# Patient Record
Sex: FEMALE | Race: WHITE | HISPANIC OR LATINO | URBAN - METROPOLITAN AREA
[De-identification: names, ages, dates, MRNs, and addresses within clinical notes are randomized per-mention and may not be internally consistent; named-entity substitution may affect disease eponyms.]

---

## 2017-06-01 ENCOUNTER — EMERGENCY (EMERGENCY)
Facility: HOSPITAL | Age: 42
LOS: 0 days | Discharge: HOME | End: 2017-06-01

## 2018-02-07 ENCOUNTER — EMERGENCY (EMERGENCY)
Facility: HOSPITAL | Age: 43
LOS: 0 days | Discharge: HOME | End: 2018-02-07
Attending: EMERGENCY MEDICINE | Admitting: FAMILY MEDICINE

## 2018-02-07 VITALS
SYSTOLIC BLOOD PRESSURE: 123 MMHG | TEMPERATURE: 98 F | HEART RATE: 96 BPM | RESPIRATION RATE: 20 BRPM | DIASTOLIC BLOOD PRESSURE: 64 MMHG | OXYGEN SATURATION: 96 %

## 2018-02-07 VITALS
TEMPERATURE: 98 F | HEART RATE: 75 BPM | OXYGEN SATURATION: 100 % | DIASTOLIC BLOOD PRESSURE: 63 MMHG | RESPIRATION RATE: 20 BRPM | SYSTOLIC BLOOD PRESSURE: 131 MMHG

## 2018-02-07 DIAGNOSIS — Z98.51 TUBAL LIGATION STATUS: ICD-10-CM

## 2018-02-07 DIAGNOSIS — K08.51: ICD-10-CM

## 2018-02-07 DIAGNOSIS — S02.5XXA FRACTURE OF TOOTH (TRAUMATIC), INITIAL ENCOUNTER FOR CLOSED FRACTURE: ICD-10-CM

## 2018-02-07 DIAGNOSIS — K04.7 PERIAPICAL ABSCESS WITHOUT SINUS: ICD-10-CM

## 2018-02-07 DIAGNOSIS — J45.909 UNSPECIFIED ASTHMA, UNCOMPLICATED: ICD-10-CM

## 2018-02-07 DIAGNOSIS — Z79.899 OTHER LONG TERM (CURRENT) DRUG THERAPY: ICD-10-CM

## 2018-02-07 DIAGNOSIS — Z98.51 TUBAL LIGATION STATUS: Chronic | ICD-10-CM

## 2018-02-07 DIAGNOSIS — K03.81 CRACKED TOOTH: ICD-10-CM

## 2018-02-07 RX ORDER — MONTELUKAST 4 MG/1
0 TABLET, CHEWABLE ORAL
Qty: 0 | Refills: 0 | COMMUNITY

## 2018-02-07 RX ORDER — BUPROPION HYDROCHLORIDE 150 MG/1
0 TABLET, EXTENDED RELEASE ORAL
Qty: 0 | Refills: 0 | COMMUNITY

## 2018-02-07 RX ORDER — SERTRALINE 25 MG/1
0 TABLET, FILM COATED ORAL
Qty: 0 | Refills: 0 | COMMUNITY

## 2018-02-07 RX ORDER — SPIRONOLACTONE 25 MG/1
0 TABLET, FILM COATED ORAL
Qty: 0 | Refills: 0 | COMMUNITY

## 2018-02-07 RX ORDER — LORATADINE 10 MG/1
0 TABLET ORAL
Qty: 0 | Refills: 0 | COMMUNITY

## 2018-02-07 NOTE — CONSULT NOTE ADULT - SUBJECTIVE AND OBJECTIVE BOX
lower left fracture tooth #19, existing amalgam , no abscess no fistula present.   patient afebrile , no swelling.

## 2018-02-07 NOTE — ED PROVIDER NOTE - OBJECTIVE STATEMENT
41 yo female with PMH of anxiety, asthma presents to the ED c/o cracked tooth with abscess x 3 days.  Pt. states her tooth cracked about three months ago, which caused an abscess to develop, but would come and go so ignored it.  Pt. states the pain got significantly worse over the past three days and decided to go to a Dentist yesterday, who told her she has an abscess in cracked tooth #19.  Patient states the Dentist prescribed her with Clindamycin and 800 mg of Motrin, which she took her last dose at around 5 pm.  Pt. is still in significant pain and tried using Orajel with minimal relief.  Pt. denies any SOB, chest pain, fever, chills ,recent illness, abdominal pain, headaches, or cough.  LMP 1/10/18.

## 2018-02-07 NOTE — ED PROVIDER NOTE - NS ED ROS FT

## 2018-02-07 NOTE — ED PROVIDER NOTE - PROGRESS NOTE DETAILS
Discussed with dental resident, will consult. Pt. is alert and answering questions appropriately.  Pt. is in no acute distress.  Pt. received nerve block and is feeling some relief.  Pt. advised to go to dental clinic tomorrow morning. I was directly involved in the management of this patient. Case was discussed with PA Fellow Chanelle ATTENDING NOTE:   43 y/o F with HX of asthma presents with L lower dental pain x 3 days. Pt states she cracked her tooth approximately 3 months ago and now has been having pain and swelling to the area. She went to the dentist yesterday, was started on Clindamycin, and was referred to an oral surgeon who she wasn’t able to see due to insurance issues. No fever/chills.   Exam- Pt is WA, sitting up on bed eating food, normal speech, multiple carries noted to lower mouth.  Plan- Will d/c pt to return here tomorrow for dental clinic.

## 2018-02-07 NOTE — ED PROVIDER NOTE - PHYSICAL EXAMINATION
PHYSICAL EXAM:  GENERAL: NAD, well-developed  HEENT: #19 fractured tooth pain radiates through #17-18.  No erythema.  NECK: Supple, non-tender. .No erythema  CHEST/LUNG: Clear to auscultation bilaterally; No wheeze, rhonchi, or rales  HEART: Regular rate and rhythm; No murmurs, rubs, or gallops  ABDOMEN: Soft, Nontender, Nondistended; Bowel sounds present

## 2018-02-07 NOTE — CONSULT NOTE ADULT - PROBLEM SELECTOR RECOMMENDATION 9
patient must follow already prescribed antibiotics by her dentist, must take naproxen 600mg q12hrs as recommended and must seek dental care .

## 2018-02-08 ENCOUNTER — EMERGENCY (EMERGENCY)
Facility: HOSPITAL | Age: 43
LOS: 0 days | Discharge: HOME | End: 2018-02-08

## 2018-02-08 VITALS
HEART RATE: 79 BPM | SYSTOLIC BLOOD PRESSURE: 110 MMHG | TEMPERATURE: 98 F | DIASTOLIC BLOOD PRESSURE: 54 MMHG | RESPIRATION RATE: 16 BRPM | OXYGEN SATURATION: 97 %

## 2018-02-08 DIAGNOSIS — Z98.51 TUBAL LIGATION STATUS: Chronic | ICD-10-CM

## 2018-02-08 DIAGNOSIS — Z88.0 ALLERGY STATUS TO PENICILLIN: ICD-10-CM

## 2018-02-08 DIAGNOSIS — K08.89 OTHER SPECIFIED DISORDERS OF TEETH AND SUPPORTING STRUCTURES: ICD-10-CM

## 2018-02-08 DIAGNOSIS — F17.200 NICOTINE DEPENDENCE, UNSPECIFIED, UNCOMPLICATED: ICD-10-CM

## 2018-02-08 DIAGNOSIS — Z79.899 OTHER LONG TERM (CURRENT) DRUG THERAPY: ICD-10-CM

## 2018-02-08 NOTE — ED PROVIDER NOTE - OBJECTIVE STATEMENT
41 y/o F with L lower dental pain x days for which she presented to ED yesterday. She requests dental block which greatly improved her symptoms yesterday. SHe is taking clindamycin and motrin with benefit. SHe has an appointment with her dentist tomorrow. She denies fevers, chills, sweats, odor, discharge, n/v/d, abdominal pain, back pain, SOB, CP.

## 2018-02-08 NOTE — ED PROVIDER NOTE - NS ED ROS FT
Review of Systems    Constitutional: (-) fever  Eyes/ENT: (-) blurry vision  Cardiovascular: (-) chest pain, (-) syncope  Respiratory: (-) cough, (-) shortness of breath  Gastrointestinal: (-) vomiting, (-) diarrhea  Musculoskeletal: (-) neck pain  Neurological: (-) headache

## 2018-02-08 NOTE — ED PROVIDER NOTE - CHPI ED SYMPTOMS NEG
no ear pain/no nasal congestion/no decreased eating/drinking/no fever/no bleeding gums/no headache/no mouth sores

## 2018-02-08 NOTE — ED PROVIDER NOTE - PROGRESS NOTE DETAILS
MICHELL Alcala: I was directly involved in the care and management of this patient while supervising PA Fellow much improved after dental block by dental resident Counseled on red flags and to return for them. Counseled on importance of follow up. Patient repeats back instructions.

## 2018-02-08 NOTE — ED PROVIDER NOTE - PHYSICAL EXAMINATION
PHYSICAL EXAM:    GENERAL: Alert, appears stated age, well appearing, non-toxic  SKIN: Warm, pink and dry. MMM.   EYE: Normal lids/conjunctiva  ENT: Normal hearing, patent oropharynx without erythema or exudate, TMs clear b/l. uvula midline +TTP of  tooth #19 with leukoplakia on gums.   NECK: +supple, no tenderness, meningismus, or JVD, +Trachea midline  Pulm: Bilateral BS, normal resp effort, no wheezes, stridor, or retractions  CV: RRR, no M/R/G, 2+ pulses throughout  Abd: soft, non-tender, non-distended, no hepatosplenomegaly  Neuro: AAOx3, no sensory/motor deficits

## 2018-07-17 PROBLEM — F41.9 ANXIETY DISORDER, UNSPECIFIED: Chronic | Status: ACTIVE | Noted: 2018-02-07

## 2018-07-17 PROBLEM — J45.909 UNSPECIFIED ASTHMA, UNCOMPLICATED: Chronic | Status: ACTIVE | Noted: 2018-02-07

## 2019-04-23 NOTE — ED ADULT TRIAGE NOTE - CADM TRG TX PRIOR TO ARRIVAL
none
17 y/o F patient w/ no significant PMHx and no significant PSHx presents to the ED with right ear swelling and pain. Patient says her cartilage earing is stuck inside her ear. Patient denies fever, chills, mastoid pain, headache, and any other complaints. NKDA.

## 2020-05-14 ENCOUNTER — OUTPATIENT (OUTPATIENT)
Dept: OUTPATIENT SERVICES | Facility: HOSPITAL | Age: 45
LOS: 1 days | Discharge: HOME | End: 2020-05-14

## 2020-05-14 DIAGNOSIS — Z98.51 TUBAL LIGATION STATUS: Chronic | ICD-10-CM

## 2021-06-28 NOTE — CONSULT NOTE ADULT - PROBLEM SELECTOR PROBLEM 1
"Patient discharging 6/28/2021 accompanied by  and destination is home. Patient demanded to be discharged AMA around 12:40 PM and was deemed \"not hold-able.\" Charge nurse spoke w/  and verified that all medications and objects that could be used for self-harm will be locked up.    Patient denies current or recent suicidal ideation    Denies all SIB urges  denies current or recent homicidal ideation or behaviors.    Patient rated depression 3/10 and anxiety 3/10. Patient has full range affect. Mood is labile. Patient verbalized coping strategies and when to call 9-1-1. Patient denied physical issues/problems. Patient's last known SIB occurred on 6/27/21 morning. No behavioral redirection needed since then.    Discharge paperwork and medications reviewed with patient who verbalizes understanding. Patient verbalized all questions and concerns pertaining to nursing related care have been addressed/answered. Patient explained she will need further clarification about DBT and outpatient therapy from the unit CTC. Patient will call tomorrow.                  " Toothache

## 2022-03-10 NOTE — ED ADULT NURSE NOTE - CHIEF COMPLAINT QUOTE
pt c/o gum pain for 3 days to the left lower region Quality 431: Preventive Care And Screening: Unhealthy Alcohol Use - Screening: Patient not identified as an unhealthy alcohol user when screened for unhealthy alcohol use using a systematic screening method Detail Level: Detailed Quality 110: Preventive Care And Screening: Influenza Immunization: Influenza Immunization Administered during Influenza season

## 2022-12-15 NOTE — ED PROVIDER NOTE - TOBACCO USE
Chief Complaint   Patient presents with     Interstitial Lung Disease (ILD)     ILD Follow up      Vitals were taken and medications were reconciled.     DEMETRI Avendano       Never smoker

## 2023-06-05 PROBLEM — Z00.00 ENCOUNTER FOR PREVENTIVE HEALTH EXAMINATION: Status: ACTIVE | Noted: 2023-06-05

## 2023-07-24 ENCOUNTER — APPOINTMENT (OUTPATIENT)
Dept: OBGYN | Facility: CLINIC | Age: 48
End: 2023-07-24

## 2023-10-30 DIAGNOSIS — Z12.39 ENCOUNTER FOR OTHER SCREENING FOR MALIGNANT NEOPLASM OF BREAST: ICD-10-CM

## 2023-11-02 ENCOUNTER — NON-APPOINTMENT (OUTPATIENT)
Age: 48
End: 2023-11-02

## 2023-11-02 ENCOUNTER — APPOINTMENT (OUTPATIENT)
Dept: OBGYN | Facility: CLINIC | Age: 48
End: 2023-11-02
Payer: MEDICARE

## 2023-11-02 VITALS
HEIGHT: 60 IN | WEIGHT: 293 LBS | BODY MASS INDEX: 57.52 KG/M2 | HEART RATE: 85 BPM | DIASTOLIC BLOOD PRESSURE: 65 MMHG | SYSTOLIC BLOOD PRESSURE: 136 MMHG

## 2023-11-02 VITALS — WEIGHT: 293 LBS | BODY MASS INDEX: 57.52 KG/M2 | HEIGHT: 60 IN

## 2023-11-02 DIAGNOSIS — Z86.59 PERSONAL HISTORY OF OTHER MENTAL AND BEHAVIORAL DISORDERS: ICD-10-CM

## 2023-11-02 DIAGNOSIS — Z01.419 ENCOUNTER FOR GYNECOLOGICAL EXAMINATION (GENERAL) (ROUTINE) W/OUT ABNORMAL FINDINGS: ICD-10-CM

## 2023-11-02 DIAGNOSIS — Z78.9 OTHER SPECIFIED HEALTH STATUS: ICD-10-CM

## 2023-11-02 DIAGNOSIS — E66.01 MORBID (SEVERE) OBESITY DUE TO EXCESS CALORIES: ICD-10-CM

## 2023-11-02 DIAGNOSIS — J45.20 MILD INTERMITTENT ASTHMA, UNCOMPLICATED: ICD-10-CM

## 2023-11-02 DIAGNOSIS — Z86.39 PERSONAL HISTORY OF OTHER ENDOCRINE, NUTRITIONAL AND METABOLIC DISEASE: ICD-10-CM

## 2023-11-02 DIAGNOSIS — N92.6 IRREGULAR MENSTRUATION, UNSPECIFIED: ICD-10-CM

## 2023-11-02 PROCEDURE — 99386 PREV VISIT NEW AGE 40-64: CPT

## 2023-11-02 RX ORDER — LORAZEPAM 2 MG/1
TABLET ORAL
Refills: 0 | Status: ACTIVE | COMMUNITY

## 2023-11-02 RX ORDER — ZOLPIDEM TARTRATE 5 MG/1
TABLET, FILM COATED ORAL
Refills: 0 | Status: ACTIVE | COMMUNITY

## 2023-11-02 RX ORDER — SEMAGLUTIDE 2.68 MG/ML
INJECTION, SOLUTION SUBCUTANEOUS
Refills: 0 | Status: ACTIVE | COMMUNITY

## 2023-11-07 LAB
C TRACH RRNA SPEC QL NAA+PROBE: NOT DETECTED
HPV HIGH+LOW RISK DNA PNL CVX: NOT DETECTED
N GONORRHOEA RRNA SPEC QL NAA+PROBE: NOT DETECTED
SOURCE AMPLIFICATION: NORMAL

## 2023-11-14 LAB — CYTOLOGY CVX/VAG DOC THIN PREP: NORMAL

## 2023-12-28 NOTE — ED PROVIDER NOTE - SCRIBE NAME
----- Message from Paris Villafana sent at 12/28/2023 10:58 AM CST -----  Contact: [phone]    Caller: Jones Palmer-    Patient: LENNOX BROOKS-    Reason: The office is requesting a call back from the nurse to inquire about getting a code(JOHN)     added to medication to get the brand name in the future.    Medication: (diazePAM (VALTOCO) 10 mg/spray (0.1 mL) Spry 2 each)    Comments: Please call the office back to advise.       
Spoke with insurance and they said that we need to submit a new script for the Valtoco with the JOHN code on it.  
Jewels Cui

## 2024-01-02 ENCOUNTER — NON-APPOINTMENT (OUTPATIENT)
Age: 49
End: 2024-01-02

## 2024-04-19 ENCOUNTER — APPOINTMENT (OUTPATIENT)
Dept: SURGERY | Facility: CLINIC | Age: 49
End: 2024-04-19

## 2024-05-17 ENCOUNTER — APPOINTMENT (OUTPATIENT)
Dept: SURGERY | Facility: CLINIC | Age: 49
End: 2024-05-17

## 2024-05-20 ENCOUNTER — APPOINTMENT (OUTPATIENT)
Dept: SURGERY | Facility: CLINIC | Age: 49
End: 2024-05-20

## 2024-06-13 ENCOUNTER — APPOINTMENT (OUTPATIENT)
Dept: SURGERY | Facility: CLINIC | Age: 49
End: 2024-06-13
Payer: MEDICARE

## 2024-06-13 VITALS
HEIGHT: 60 IN | SYSTOLIC BLOOD PRESSURE: 122 MMHG | DIASTOLIC BLOOD PRESSURE: 80 MMHG | WEIGHT: 293 LBS | BODY MASS INDEX: 57.52 KG/M2

## 2024-06-13 DIAGNOSIS — M17.9 OSTEOARTHRITIS OF KNEE, UNSPECIFIED: ICD-10-CM

## 2024-06-13 DIAGNOSIS — E66.01 MORBID (SEVERE) OBESITY DUE TO EXCESS CALORIES: ICD-10-CM

## 2024-06-13 DIAGNOSIS — Z83.3 FAMILY HISTORY OF DIABETES MELLITUS: ICD-10-CM

## 2024-06-13 DIAGNOSIS — E88.810 METABOLIC SYNDROME: ICD-10-CM

## 2024-06-13 DIAGNOSIS — Z60.2 PROBLEMS RELATED TO LIVING ALONE: ICD-10-CM

## 2024-06-13 DIAGNOSIS — Z80.3 FAMILY HISTORY OF MALIGNANT NEOPLASM OF BREAST: ICD-10-CM

## 2024-06-13 DIAGNOSIS — E28.2 POLYCYSTIC OVARIAN SYNDROME: ICD-10-CM

## 2024-06-13 DIAGNOSIS — Z78.9 OTHER SPECIFIED HEALTH STATUS: ICD-10-CM

## 2024-06-13 DIAGNOSIS — F41.9 ANXIETY DISORDER, UNSPECIFIED: ICD-10-CM

## 2024-06-13 DIAGNOSIS — F43.10 POST-TRAUMATIC STRESS DISORDER, UNSPECIFIED: ICD-10-CM

## 2024-06-13 DIAGNOSIS — Z82.49 FAMILY HISTORY OF ISCHEMIC HEART DISEASE AND OTHER DISEASES OF THE CIRCULATORY SYSTEM: ICD-10-CM

## 2024-06-13 DIAGNOSIS — E78.5 HYPERLIPIDEMIA, UNSPECIFIED: ICD-10-CM

## 2024-06-13 DIAGNOSIS — Z83.49 FAMILY HISTORY OF OTHER ENDOCRINE, NUTRITIONAL AND METABOLIC DISEASES: ICD-10-CM

## 2024-06-13 DIAGNOSIS — F32.A ANXIETY DISORDER, UNSPECIFIED: ICD-10-CM

## 2024-06-13 DIAGNOSIS — G43.909 MIGRAINE, UNSPECIFIED, NOT INTRACTABLE, W/OUT STATUS MIGRAINOSUS: ICD-10-CM

## 2024-06-13 DIAGNOSIS — R73.03 PREDIABETES.: ICD-10-CM

## 2024-06-13 DIAGNOSIS — G47.33 OBSTRUCTIVE SLEEP APNEA (ADULT) (PEDIATRIC): ICD-10-CM

## 2024-06-13 DIAGNOSIS — G47.00 INSOMNIA, UNSPECIFIED: ICD-10-CM

## 2024-06-13 PROCEDURE — G2211 COMPLEX E/M VISIT ADD ON: CPT

## 2024-06-13 PROCEDURE — 99205 OFFICE O/P NEW HI 60 MIN: CPT

## 2024-06-13 RX ORDER — METFORMIN ER 500 MG 500 MG/1
500 TABLET ORAL
Qty: 60 | Refills: 2 | Status: ACTIVE | COMMUNITY
Start: 2024-06-13 | End: 1900-01-01

## 2024-06-13 SDOH — SOCIAL STABILITY - SOCIAL INSECURITY: PROBLEMS RELATED TO LIVING ALONE: Z60.2

## 2024-06-28 PROBLEM — F41.9 ANXIETY AND DEPRESSION: Status: ACTIVE | Noted: 2024-06-28

## 2024-06-28 PROBLEM — G43.909 MIGRAINES: Status: ACTIVE | Noted: 2024-06-28

## 2024-06-28 PROBLEM — E78.5 DYSLIPIDEMIA: Status: ACTIVE | Noted: 2024-06-28

## 2024-06-28 PROBLEM — E66.01 CLASS 3 OBESITY: Status: ACTIVE | Noted: 2024-06-13

## 2024-06-28 PROBLEM — M17.9 KNEE OSTEOARTHRITIS: Status: ACTIVE | Noted: 2024-06-28

## 2024-06-29 PROBLEM — G47.00 INSOMNIA: Status: ACTIVE | Noted: 2024-06-28

## 2024-06-29 PROBLEM — G47.33 OBSTRUCTIVE SLEEP APNEA, ADULT: Status: ACTIVE | Noted: 2024-06-29

## 2024-06-29 PROBLEM — Z60.2 LIVES ALONE: Status: ACTIVE | Noted: 2024-06-29

## 2024-06-29 PROBLEM — Z80.3 FAMILY HISTORY OF MALIGNANT NEOPLASM OF BREAST: Status: ACTIVE | Noted: 2024-06-29

## 2024-06-29 PROBLEM — Z82.49 FAMILY HISTORY OF HYPERTENSION: Status: ACTIVE | Noted: 2024-06-29

## 2024-06-29 PROBLEM — Z78.9 DENIES ALCOHOL CONSUMPTION: Status: ACTIVE | Noted: 2024-06-29

## 2024-06-29 PROBLEM — Z83.3 FAMILY HISTORY OF TYPE 2 DIABETES MELLITUS: Status: ACTIVE | Noted: 2024-06-29

## 2024-06-29 PROBLEM — Z83.49 FAMILY HISTORY OF OBESITY: Status: ACTIVE | Noted: 2024-06-29

## 2024-06-29 NOTE — ASSESSMENT
[FreeTextEntry1] :  A/P: 48 year old female here for her initial medical weight loss appointment. PMHx is significant for class 3 obesity, asthma, ELLE, dyslipidemia, prediabetes, PCOS, HAs/migraines, knee OA, insomnia, prediabetes, and anxiety/depression/PTSD.   -Will work together with an interdisciplinary team to help achieve their weight loss goals.  Will order a set of labs to better understand her metabolic state.  Will connect with RD to help optimize nutrition and to increase protein as tolerated. For management of prediabetes vs PCOS, will try metformin  mg. Discussed possible side effects including gastrointestinal upset, especially diarrhea. Will start with one tab with breakfast for one week. Increase to one tab with breakfast and one tab with dinner the second week. Rx sent.  AOM Considerations: In future, may add to complete generics of Contrave Topiramate - may help with cravings and to regulate sleep cycle GLP-1 would be an excellent option - may consider pending insurance approval  Depression/Anxiety/PTSD - continue bupropion and sertrailine. Continue to work with psychiatrist and therapist per routine Insomnia - currently manages with ambien and hydroxyzine - needs to regulate sleep schedule to better match diurnal patterns ELLE - continue to monitor with weight loss PCOS - continue spironolactone Dyslipidemia/metabolic syndrome - continue to monitor with weight loss -Encouraged her to increase physical activity as tolerated.   F/U with RD first available F/U with MD in 6-8 weeks  Patient was seen by Dr. Stevens on 6/13/24. Time spent with patient was greater than 65 minutes, including chart review, time spent with patient, and charting.

## 2024-06-29 NOTE — DATA REVIEWED
[FreeTextEntry1] : Pertinent Labs 2/11/24: FBG 91 hemoglobin A1c 5.4 (blood work done while still on Ozempic) TChol 196 HDL 57 TGs 180H  H

## 2024-06-29 NOTE — HISTORY OF PRESENT ILLNESS
[de-identified] : Referred by: internet   HPI: 48 year old female here for her initial medical weight loss appointment. PMHx is significant for class 3 obesity, asthma, ELLE, dyslipidemia, prediabetes, PCOS, HAs/migraines, knee OA, insomnia, prediabetes, and anxiety/depression/PTSD.    Weight History: Highest Weight: 335 lbs (2022) Lowest Weight: 290 lbs (2019) Current Weight: 317 lbs/61.19 kg/m2   Previous Weight Loss Efforts: Has been struggling with her weight for years. Weight first started to increase between 1481-7286  Has tried several diets in the past with minimal long term success, including: IF Smaller portions  Was started on bupropion 300mg, five years ago - Initially lost 70 lbs - stopped with weight regain and has since restarted Was started on Ozempic - went up to 1 mg - tolerated well. Took in 2023 --- lost weight - D'cd because she lost coverage. Was able to extend her remaining supply by decreasing her dose to 0.5 mg - has 2 more weeks left.   May have also tried topiramate - but cannot recall for certain   Previous use of AOMs: as above Medications that may have contributed to weight gain: possibly venlafaxine - uncertain   Pertinent Labs 2/11/24: FBG 91 hemoglobin A1c 5.4 (blood work done while still on Ozempic) TChol 196 HDL 57 TGs 180H  H  Review of Systems: SOB with exertion Knee pain R >L Back pain  Hirsuitism and acne Fatigue Denies CP Denies GERD Numbness and tingling in the hands and feet Migraines 2-3x/month (pressure pain) Regular BMs Heel spurs - cannot stand on her feet for long periods of time 2/2 pain  Eating behaviors: Craves sweet and salty Likes elicia chips vs regular chips Eats medium to large portions Feels full Sometimes has to force herself to eat    24 Hr Recall: water Starts to eat between 6-7pm Chicken vs tacos + vegs vs Pasta vs rice and beans 3-4AM - eats leftovers from the first meal Sn: several cookies (PB cup cookies)  Debbi: water, rare [place] iced tea, cranberry vs OJ, denies coffee, rare soda  Movement:  Uses a cane  "Doesn't go out a lot" Cannot stand for long Does "a lot stretching"  SocHx: Lives alone Has 3 kids (32, 31, 28) Has 3 grandchildren Denies alcohol Never smoker Used to do home care Is engaged  Sleep: Has a diagnosis of ELLE  Does not use a mask   Takes ambien daily Gets up at 2 AM Goes back to sleep between 6-7AM Has been on this schedule for years  Mental Health: Sertraline and bupropion are prescribed by Dr. Sprague - Meets with them 1x/month Is being managed for depresson/anxiety and PTSD Also meets with a therapist 1x/week Has had a "nervous breakdown" - has episodes of anxiety  Reproductive/Preventive Screenings:  Reports irregular periods LMP 5/7/24 Skipped April Has heavy periods - last 5-7 days Pap UTD Mammo -has not had one Has Cologuard - has not yet used

## 2024-06-29 NOTE — PHYSICAL EXAM
[No Rash or Lesion] : No rash or lesion [Alert] : alert [Oriented to Person] : oriented to person [Oriented to Place] : oriented to place [Oriented to Time] : oriented to time [Calm] : calm [de-identified] :  Well appearing female in NAD [de-identified] :  CNs II-XII grossly intact [de-identified] :  No increased WOB [de-identified] : +hirsuitism and acne

## 2024-08-06 ENCOUNTER — APPOINTMENT (OUTPATIENT)
Dept: SURGERY | Facility: CLINIC | Age: 49
End: 2024-08-06

## 2024-08-20 ENCOUNTER — APPOINTMENT (OUTPATIENT)
Dept: SURGERY | Facility: CLINIC | Age: 49
End: 2024-08-20
Payer: MEDICARE

## 2024-08-20 VITALS — WEIGHT: 293 LBS | BODY MASS INDEX: 57.52 KG/M2 | HEIGHT: 60 IN

## 2024-08-20 PROCEDURE — ZZZZZ: CPT

## 2024-09-10 ENCOUNTER — APPOINTMENT (OUTPATIENT)
Dept: SURGERY | Facility: CLINIC | Age: 49
End: 2024-09-10

## 2024-10-07 ENCOUNTER — APPOINTMENT (OUTPATIENT)
Dept: SURGERY | Facility: CLINIC | Age: 49
End: 2024-10-07

## 2024-10-07 VITALS — HEIGHT: 60 IN | BODY MASS INDEX: 57.52 KG/M2 | WEIGHT: 293 LBS

## 2024-10-07 DIAGNOSIS — E28.2 POLYCYSTIC OVARIAN SYNDROME: ICD-10-CM

## 2024-10-07 DIAGNOSIS — E78.5 HYPERLIPIDEMIA, UNSPECIFIED: ICD-10-CM

## 2024-10-07 DIAGNOSIS — E88.810 METABOLIC SYNDROME: ICD-10-CM

## 2024-10-07 DIAGNOSIS — F41.9 ANXIETY DISORDER, UNSPECIFIED: ICD-10-CM

## 2024-10-07 DIAGNOSIS — M17.9 OSTEOARTHRITIS OF KNEE, UNSPECIFIED: ICD-10-CM

## 2024-10-07 DIAGNOSIS — R73.03 PREDIABETES.: ICD-10-CM

## 2024-10-07 DIAGNOSIS — G43.909 MIGRAINE, UNSPECIFIED, NOT INTRACTABLE, W/OUT STATUS MIGRAINOSUS: ICD-10-CM

## 2024-10-07 DIAGNOSIS — G47.33 OBSTRUCTIVE SLEEP APNEA (ADULT) (PEDIATRIC): ICD-10-CM

## 2024-10-07 DIAGNOSIS — E66.813 OBESITY, CLASS 3: ICD-10-CM

## 2024-10-07 DIAGNOSIS — F32.A ANXIETY DISORDER, UNSPECIFIED: ICD-10-CM

## 2024-10-07 DIAGNOSIS — G47.00 INSOMNIA, UNSPECIFIED: ICD-10-CM

## 2024-10-07 DIAGNOSIS — E55.9 VITAMIN D DEFICIENCY, UNSPECIFIED: ICD-10-CM

## 2024-10-07 DIAGNOSIS — F43.10 POST-TRAUMATIC STRESS DISORDER, UNSPECIFIED: ICD-10-CM

## 2024-10-07 PROCEDURE — G2211 COMPLEX E/M VISIT ADD ON: CPT

## 2024-10-07 PROCEDURE — 99214 OFFICE O/P EST MOD 30 MIN: CPT

## 2024-10-07 RX ORDER — TIRZEPATIDE 2.5 MG/.5ML
2.5 INJECTION, SOLUTION SUBCUTANEOUS
Qty: 1 | Refills: 2 | Status: ACTIVE | COMMUNITY
Start: 2024-10-07 | End: 1900-01-01

## 2024-10-07 RX ORDER — ERGOCALCIFEROL 1.25 MG/1
50000 CAPSULE ORAL
Qty: 8 | Refills: 1 | Status: ACTIVE | COMMUNITY
Start: 2024-10-07 | End: 1900-01-01

## 2024-10-08 ENCOUNTER — NON-APPOINTMENT (OUTPATIENT)
Age: 49
End: 2024-10-08

## 2024-10-08 ENCOUNTER — APPOINTMENT (OUTPATIENT)
Dept: SURGERY | Facility: CLINIC | Age: 49
End: 2024-10-08

## 2024-10-21 PROBLEM — R73.03 PREDIABETES: Status: RESOLVED | Noted: 2024-06-13 | Resolved: 2024-10-21

## 2024-10-21 PROBLEM — E11.9 TYPE 2 DIABETES MELLITUS: Status: ACTIVE | Noted: 2024-10-21

## 2024-11-11 ENCOUNTER — APPOINTMENT (OUTPATIENT)
Dept: OBGYN | Facility: CLINIC | Age: 49
End: 2024-11-11

## 2024-12-05 ENCOUNTER — APPOINTMENT (OUTPATIENT)
Dept: SURGERY | Facility: CLINIC | Age: 49
End: 2024-12-05

## 2025-03-14 ENCOUNTER — RX RENEWAL (OUTPATIENT)
Age: 50
End: 2025-03-14

## 2025-03-14 RX ORDER — ERGOCALCIFEROL 1.25 MG/1
1.25 MG CAPSULE, LIQUID FILLED ORAL
Qty: 8 | Refills: 1 | Status: ACTIVE | COMMUNITY
Start: 2025-03-14 | End: 1900-01-01

## 2025-04-11 ENCOUNTER — RX RENEWAL (OUTPATIENT)
Age: 50
End: 2025-04-11

## 2025-05-20 ENCOUNTER — APPOINTMENT (OUTPATIENT)
Dept: SURGERY | Facility: CLINIC | Age: 50
End: 2025-05-20

## 2025-05-20 VITALS
SYSTOLIC BLOOD PRESSURE: 122 MMHG | OXYGEN SATURATION: 96 % | HEART RATE: 103 BPM | DIASTOLIC BLOOD PRESSURE: 74 MMHG | BODY MASS INDEX: 57.52 KG/M2 | HEIGHT: 60 IN | WEIGHT: 293 LBS

## 2025-05-20 DIAGNOSIS — M79.672 PAIN IN RIGHT FOOT: ICD-10-CM

## 2025-05-20 DIAGNOSIS — M79.673 PAIN IN UNSPECIFIED FOOT: ICD-10-CM

## 2025-05-20 DIAGNOSIS — M79.671 PAIN IN RIGHT FOOT: ICD-10-CM

## 2025-05-20 PROCEDURE — 99214 OFFICE O/P EST MOD 30 MIN: CPT

## 2025-05-20 PROCEDURE — G2211 COMPLEX E/M VISIT ADD ON: CPT

## 2025-05-20 RX ORDER — LORAZEPAM 0.5 MG/1
0.5 TABLET ORAL
Refills: 0 | Status: ACTIVE | COMMUNITY

## 2025-05-20 RX ORDER — SPIRONOLACTONE 100 MG/1
100 TABLET ORAL
Refills: 0 | Status: ACTIVE | COMMUNITY

## 2025-06-16 ENCOUNTER — NON-APPOINTMENT (OUTPATIENT)
Age: 50
End: 2025-06-16

## 2025-06-16 ENCOUNTER — APPOINTMENT (OUTPATIENT)
Dept: PODIATRY | Facility: CLINIC | Age: 50
End: 2025-06-16

## 2025-06-16 ENCOUNTER — OUTPATIENT (OUTPATIENT)
Dept: OUTPATIENT SERVICES | Facility: HOSPITAL | Age: 50
LOS: 1 days | End: 2025-06-16
Payer: MEDICARE

## 2025-06-16 DIAGNOSIS — M79.671 PAIN IN RIGHT FOOT: ICD-10-CM

## 2025-06-16 DIAGNOSIS — Z98.51 TUBAL LIGATION STATUS: Chronic | ICD-10-CM

## 2025-06-16 PROCEDURE — 73620 X-RAY EXAM OF FOOT: CPT | Mod: 26,50

## 2025-06-16 PROCEDURE — 73620 X-RAY EXAM OF FOOT: CPT | Mod: 50

## 2025-06-16 PROCEDURE — 99203 OFFICE O/P NEW LOW 30 MIN: CPT

## 2025-06-16 RX ORDER — MELOXICAM 15 MG/1
15 TABLET ORAL DAILY
Qty: 15 | Refills: 3 | Status: ACTIVE | COMMUNITY
Start: 2025-06-16 | End: 1900-01-01

## 2025-06-17 DIAGNOSIS — M79.671 PAIN IN RIGHT FOOT: ICD-10-CM

## 2025-06-29 PROBLEM — Z86.39 HISTORY OF MORBID OBESITY: Status: RESOLVED | Noted: 2023-11-02 | Resolved: 2025-06-29

## 2025-06-30 ENCOUNTER — APPOINTMENT (OUTPATIENT)
Dept: PODIATRY | Facility: CLINIC | Age: 50
End: 2025-06-30

## 2025-08-15 ENCOUNTER — APPOINTMENT (OUTPATIENT)
Dept: SURGERY | Facility: CLINIC | Age: 50
End: 2025-08-15

## 2025-08-15 VITALS
BODY MASS INDEX: 57.52 KG/M2 | OXYGEN SATURATION: 95 % | SYSTOLIC BLOOD PRESSURE: 132 MMHG | TEMPERATURE: 97 F | WEIGHT: 293 LBS | HEIGHT: 60 IN | HEART RATE: 96 BPM | DIASTOLIC BLOOD PRESSURE: 88 MMHG

## 2025-08-15 DIAGNOSIS — R19.7 DIARRHEA, UNSPECIFIED: ICD-10-CM

## 2025-08-15 DIAGNOSIS — K21.9 GASTRO-ESOPHAGEAL REFLUX DISEASE W/OUT ESOPHAGITIS: ICD-10-CM

## 2025-08-15 RX ORDER — MULTIVIT-MIN/FOLIC/VIT K/LYCOP 400-300MCG
1000 TABLET ORAL
Refills: 0 | Status: ACTIVE | COMMUNITY

## 2025-08-15 RX ORDER — FAMOTIDINE 40 MG/1
40 TABLET, FILM COATED ORAL DAILY
Qty: 30 | Refills: 2 | Status: ACTIVE | COMMUNITY
Start: 2025-08-15 | End: 1900-01-01

## 2025-08-15 RX ORDER — POTASSIUM 95 MG
95 TABLET ORAL
Refills: 0 | Status: ACTIVE | COMMUNITY

## 2025-08-15 RX ORDER — LOPERAMIDE HYDROCHLORIDE 2 MG/1
2 CAPSULE ORAL
Qty: 30 | Refills: 2 | Status: ACTIVE | COMMUNITY
Start: 2025-08-15 | End: 1900-01-01

## 2025-08-15 RX ORDER — METHYLDOPA/HYDROCHLOROTHIAZIDE 250MG-25MG
TABLET ORAL
Refills: 0 | Status: ACTIVE | COMMUNITY

## 2025-09-08 ENCOUNTER — APPOINTMENT (OUTPATIENT)
Dept: PODIATRY | Facility: CLINIC | Age: 50
End: 2025-09-08